# Patient Record
Sex: MALE | Race: WHITE
[De-identification: names, ages, dates, MRNs, and addresses within clinical notes are randomized per-mention and may not be internally consistent; named-entity substitution may affect disease eponyms.]

---

## 2018-02-01 ENCOUNTER — HOSPITAL ENCOUNTER (EMERGENCY)
Dept: HOSPITAL 17 - NEPC | Age: 78
Discharge: HOME | End: 2018-02-01
Payer: OTHER GOVERNMENT

## 2018-02-01 VITALS
SYSTOLIC BLOOD PRESSURE: 161 MMHG | HEART RATE: 85 BPM | RESPIRATION RATE: 18 BRPM | OXYGEN SATURATION: 97 % | DIASTOLIC BLOOD PRESSURE: 73 MMHG | TEMPERATURE: 98.3 F

## 2018-02-01 VITALS — BODY MASS INDEX: 22.05 KG/M2 | HEIGHT: 68 IN | WEIGHT: 145.51 LBS

## 2018-02-01 DIAGNOSIS — T81.4XXA: Primary | ICD-10-CM

## 2018-02-01 DIAGNOSIS — I10: ICD-10-CM

## 2018-02-01 DIAGNOSIS — B96.20: ICD-10-CM

## 2018-02-01 LAB
ALBUMIN SERPL-MCNC: 3.6 GM/DL (ref 3.4–5)
ALP SERPL-CCNC: 102 U/L (ref 45–117)
ALT SERPL-CCNC: 18 U/L (ref 12–78)
AST SERPL-CCNC: 15 U/L (ref 15–37)
BASOPHILS # BLD AUTO: 0 TH/MM3 (ref 0–0.2)
BASOPHILS NFR BLD: 0.4 % (ref 0–2)
BILIRUB SERPL-MCNC: 0.7 MG/DL (ref 0.2–1)
BUN SERPL-MCNC: 26 MG/DL (ref 7–18)
CALCIUM SERPL-MCNC: 9.4 MG/DL (ref 8.5–10.1)
CHLORIDE SERPL-SCNC: 103 MEQ/L (ref 98–107)
CREAT SERPL-MCNC: 1.27 MG/DL (ref 0.6–1.3)
EOSINOPHIL # BLD: 0.1 TH/MM3 (ref 0–0.4)
EOSINOPHIL NFR BLD: 1 % (ref 0–4)
ERYTHROCYTE [DISTWIDTH] IN BLOOD BY AUTOMATED COUNT: 12.8 % (ref 11.6–17.2)
GFR SERPLBLD BASED ON 1.73 SQ M-ARVRAT: 55 ML/MIN (ref 89–?)
GLUCOSE SERPL-MCNC: 105 MG/DL (ref 74–106)
HCO3 BLD-SCNC: 26.1 MEQ/L (ref 21–32)
HCT VFR BLD CALC: 41.4 % (ref 39–51)
HGB BLD-MCNC: 14.1 GM/DL (ref 13–17)
LYMPHOCYTES # BLD AUTO: 1.7 TH/MM3 (ref 1–4.8)
LYMPHOCYTES NFR BLD AUTO: 18.3 % (ref 9–44)
MCH RBC QN AUTO: 31.3 PG (ref 27–34)
MCHC RBC AUTO-ENTMCNC: 34 % (ref 32–36)
MCV RBC AUTO: 92 FL (ref 80–100)
MONOCYTE #: 0.9 TH/MM3 (ref 0–0.9)
MONOCYTES NFR BLD: 9.8 % (ref 0–8)
NEUTROPHILS # BLD AUTO: 6.7 TH/MM3 (ref 1.8–7.7)
NEUTROPHILS NFR BLD AUTO: 70.5 % (ref 16–70)
PLATELET # BLD: 419 TH/MM3 (ref 150–450)
PMV BLD AUTO: 8.5 FL (ref 7–11)
PROT SERPL-MCNC: 8.5 GM/DL (ref 6.4–8.2)
RBC # BLD AUTO: 4.5 MIL/MM3 (ref 4.5–5.9)
SODIUM SERPL-SCNC: 139 MEQ/L (ref 136–145)
WBC # BLD AUTO: 9.5 TH/MM3 (ref 4–11)

## 2018-02-01 PROCEDURE — 87040 BLOOD CULTURE FOR BACTERIA: CPT

## 2018-02-01 PROCEDURE — 87070 CULTURE OTHR SPECIMN AEROBIC: CPT

## 2018-02-01 PROCEDURE — 87186 SC STD MICRODIL/AGAR DIL: CPT

## 2018-02-01 PROCEDURE — 80053 COMPREHEN METABOLIC PANEL: CPT

## 2018-02-01 PROCEDURE — 99283 EMERGENCY DEPT VISIT LOW MDM: CPT

## 2018-02-01 PROCEDURE — 85025 COMPLETE CBC W/AUTO DIFF WBC: CPT

## 2018-02-01 PROCEDURE — 87205 SMEAR GRAM STAIN: CPT

## 2018-02-01 PROCEDURE — 83605 ASSAY OF LACTIC ACID: CPT

## 2018-02-01 PROCEDURE — 87077 CULTURE AEROBIC IDENTIFY: CPT

## 2018-02-01 NOTE — PD
HPI


Chief Complaint:  Medical Device Problem


Time Seen by Provider:  16:28


Travel History


International Travel<30 days:  No


Contact w/Intl Traveler<30days:  No


Traveled to known affect area:  No





History of Present Illness


HPI


77-year-old male complains of drainage from the surgical wound on the abdomen.  

Patient has history of diverticulitis with bowel perforation.  Patient status 

post bowel surgery with colostomy in [place] a month ago.  Patient states that 

he has serosanguineous drainage from the surgical wound for the past 3 weeks.  

Patient denies any fever chills.  Patient denies abdominal pain.  Patient 

states that the ostomy has been draining well.  Patient has appointment with 

surgeon tomorrow in [place].





PFSH


Past Medical History


Cardiovascular Problems:  Yes


Diverticulitis:  Yes


Hiatal Hernia:  Yes


Hypertension:  Yes





Past Surgical History


Other Surgery:  Yes (hernia sx, ostomy)





Social History


Alcohol Use:  No


Tobacco Use:  No


Substance Use:  No





Allergies-Medications


(Allergen,Severity, Reaction):  


Coded Allergies:  


     pollen extracts (Verified  Adverse Reaction, Mild, Sneezing, 2/1/18)


Reported Meds & Prescriptions





Reported Meds & Active Scripts


Active


Doxycycline Hyclate 100 Mg Cap 100 Mg PO BID


Cipro (Ciprofloxacin HCl) 500 Mg Tab 500 Mg PO BID


Reported


Allopurinol 100 Mg Tab 100 Mg PO DAILY


Hydrochlorothiazide 12.5 Mg Cap 12.5 Mg PO DAILY


Atenolol 25 Mg Tab 12.5 Mg PO DAILY








Review of Systems


General / Constitutional:  No: Fever


Eyes:  No: Visual changes


HENT:  No: Headaches


Cardiovascular:  No: Chest Pain or Discomfort


Respiratory:  No: Shortness of Breath


Gastrointestinal:  No: Abdominal Pain


Genitourinary:  No: Dysuria


Musculoskeletal:  No: Pain


Skin:  No Rash


Neurologic:  No: Weakness


Psychiatric:  No: Depression


Endocrine:  No: Polydipsia


Hematologic/Lymphatic:  No: Easy Bruising





Physical Exam


Narrative


GENERAL: Well-nourished, well-developed patient.


SKIN: Focused skin assessment warm/dry.


HEAD: Normocephalic.


EYES: No scleral icterus. No injection or drainage. 


NECK: Supple, trachea midline. No JVD or lymphadenopathy.


CARDIOVASCULAR: Regular rate and rhythm without murmurs, gallops, or rubs. 


RESPIRATORY: Breath sounds equal bilaterally. No accessory muscle use.


GASTROINTESTINAL: Abdomen soft, non-tender, nondistended. 


MUSCULOSKELETAL: No cyanosis, or edema. 


BACK: Nontender without obvious deformity. No CVA tenderness. 


Examination of the surgical wound on the left side of the abdomen shows staples 

in place.  The wound edges redness with pussy discharge.  Ostomy in place and 

draining well.





Data


Data


Last Documented VS





Vital Signs








  Date Time  Temp Pulse Resp B/P (MAP) Pulse Ox O2 Delivery O2 Flow Rate FiO2


 


2/1/18 14:49 98.3 85 18 161/73 (102) 97   








Orders





 Orders


Complete Blood Count With Diff (2/1/18 15:02)


Comprehensive Metabolic Panel (2/1/18 15:02)


Lactic Acid Sepsis Protocol (2/1/18 15:02)


Blood Culture (2/1/18 15:02)


Wound Culture And Gram Stain (2/1/18 17:00)


Ciprofloxacin (Cipro) (2/1/18 17:15)


Doxycycline (Vibramycin) (2/1/18 17:15)


Ed Discharge Order (2/1/18 17:25)





Labs





Laboratory Tests








Test


  2/1/18


15:10 2/1/18


15:16


 


White Blood Count 9.5 TH/MM3  


 


Red Blood Count 4.50 MIL/MM3  


 


Hemoglobin 14.1 GM/DL  


 


Hematocrit 41.4 %  


 


Mean Corpuscular Volume 92.0 FL  


 


Mean Corpuscular Hemoglobin 31.3 PG  


 


Mean Corpuscular Hemoglobin


Concent 34.0 % 


  


 


 


Red Cell Distribution Width 12.8 %  


 


Platelet Count 419 TH/MM3  


 


Mean Platelet Volume 8.5 FL  


 


Neutrophils (%) (Auto) 70.5 %  


 


Lymphocytes (%) (Auto) 18.3 %  


 


Monocytes (%) (Auto) 9.8 %  


 


Eosinophils (%) (Auto) 1.0 %  


 


Basophils (%) (Auto) 0.4 %  


 


Neutrophils # (Auto) 6.7 TH/MM3  


 


Lymphocytes # (Auto) 1.7 TH/MM3  


 


Monocytes # (Auto) 0.9 TH/MM3  


 


Eosinophils # (Auto) 0.1 TH/MM3  


 


Basophils # (Auto) 0.0 TH/MM3  


 


CBC Comment DIFF FINAL  


 


Differential Comment   


 


Blood Urea Nitrogen 26 MG/DL  


 


Creatinine 1.27 MG/DL  


 


Random Glucose 105 MG/DL  


 


Total Protein 8.5 GM/DL  


 


Albumin 3.6 GM/DL  


 


Calcium Level 9.4 MG/DL  


 


Alkaline Phosphatase 102 U/L  


 


Aspartate Amino Transf


(AST/SGOT) 15 U/L 


  


 


 


Alanine Aminotransferase


(ALT/SGPT) 18 U/L 


  


 


 


Total Bilirubin 0.7 MG/DL  


 


Sodium Level 139 MEQ/L  


 


Potassium Level 3.7 MEQ/L  


 


Chloride Level 103 MEQ/L  


 


Carbon Dioxide Level 26.1 MEQ/L  


 


Anion Gap 10 MEQ/L  


 


Estimat Glomerular Filtration


Rate 55 ML/MIN 


  


 


 


Lactic Acid Level  1.2 mmol/L 











MDM


Medical Decision Making


Medical Screen Exam Complete:  Yes


Emergency Medical Condition:  Yes


Interpretation(s)


CBC within normal limit.  CMP within normal limit.


Differential Diagnosis


Differential diagnosis including cellulitis, abscess.


Narrative Course


77-year-old male with surgical wound infection.  Status post bowel surgery with 

colostomy in place.  Wound culture obtained.  Cipro 500 mg by mouth given.  

Doxycycline 100 mg by mouth given.





Diagnosis





 Primary Impression:  


 Surgical wound infection


 Qualified Codes:  T81.4XXA - Infection following a procedure, initial encounter


Patient Instructions:  General Instructions





***Additional Instructions:  


Cipro and doxycycline as directed.  Wound care daily.  Follow-up with surgeon 

as scheduled in the a.m.  Return if worse.


***Med/Other Pt SpecificInfo:  Prescription(s) given


Scripts


Sulfamethoxazole-Trimethoprim (Bactrim DS) 800-160 Mg Tab


1 TAB PO BID for Infection, #20 TAB 0 Refills


   Prov: Panda Campos MD         2/1/18 


Ciprofloxacin (Cipro) 500 Mg Tab


500 MG PO BID for Infection, #20 TAB 0 Refills


   Prov: Panda Campos MD         2/1/18


Disposition:  01 DISCHARGE HOME


Condition:  Stable











Panda Campos MD Feb 1, 2018 17:12

## 2018-02-24 ENCOUNTER — HOSPITAL ENCOUNTER (INPATIENT)
Dept: HOSPITAL 17 - NEPC | Age: 78
Discharge: HOME | DRG: 863 | End: 2018-02-24
Attending: FAMILY MEDICINE | Admitting: FAMILY MEDICINE
Payer: OTHER GOVERNMENT

## 2018-02-24 VITALS
OXYGEN SATURATION: 99 % | DIASTOLIC BLOOD PRESSURE: 78 MMHG | TEMPERATURE: 98.3 F | RESPIRATION RATE: 18 BRPM | SYSTOLIC BLOOD PRESSURE: 163 MMHG | HEART RATE: 66 BPM

## 2018-02-24 VITALS
OXYGEN SATURATION: 98 % | SYSTOLIC BLOOD PRESSURE: 170 MMHG | DIASTOLIC BLOOD PRESSURE: 73 MMHG | TEMPERATURE: 97.5 F | HEART RATE: 74 BPM | RESPIRATION RATE: 16 BRPM

## 2018-02-24 VITALS — DIASTOLIC BLOOD PRESSURE: 65 MMHG | SYSTOLIC BLOOD PRESSURE: 144 MMHG

## 2018-02-24 VITALS — WEIGHT: 132.72 LBS | HEIGHT: 68 IN | BODY MASS INDEX: 20.11 KG/M2

## 2018-02-24 VITALS — OXYGEN SATURATION: 97 %

## 2018-02-24 VITALS — DIASTOLIC BLOOD PRESSURE: 66 MMHG | SYSTOLIC BLOOD PRESSURE: 144 MMHG | HEART RATE: 87 BPM

## 2018-02-24 DIAGNOSIS — E78.00: ICD-10-CM

## 2018-02-24 DIAGNOSIS — I10: ICD-10-CM

## 2018-02-24 DIAGNOSIS — T81.4XXA: Primary | ICD-10-CM

## 2018-02-24 DIAGNOSIS — H91.90: ICD-10-CM

## 2018-02-24 DIAGNOSIS — L02.211: ICD-10-CM

## 2018-02-24 DIAGNOSIS — Z93.3: ICD-10-CM

## 2018-02-24 LAB
ALBUMIN SERPL-MCNC: 3.8 GM/DL (ref 3.4–5)
ALP SERPL-CCNC: 75 U/L (ref 45–117)
ALT SERPL-CCNC: 31 U/L (ref 12–78)
AST SERPL-CCNC: 14 U/L (ref 15–37)
BASOPHILS # BLD AUTO: 0 TH/MM3 (ref 0–0.2)
BASOPHILS NFR BLD: 0.7 % (ref 0–2)
BILIRUB SERPL-MCNC: 0.7 MG/DL (ref 0.2–1)
BUN SERPL-MCNC: 16 MG/DL (ref 7–18)
CALCIUM SERPL-MCNC: 9.2 MG/DL (ref 8.5–10.1)
CHLORIDE SERPL-SCNC: 103 MEQ/L (ref 98–107)
CREAT SERPL-MCNC: 1.04 MG/DL (ref 0.6–1.3)
EOSINOPHIL # BLD: 0.1 TH/MM3 (ref 0–0.4)
EOSINOPHIL NFR BLD: 2.2 % (ref 0–4)
ERYTHROCYTE [DISTWIDTH] IN BLOOD BY AUTOMATED COUNT: 13.7 % (ref 11.6–17.2)
GFR SERPLBLD BASED ON 1.73 SQ M-ARVRAT: 69 ML/MIN (ref 89–?)
GLUCOSE SERPL-MCNC: 100 MG/DL (ref 74–106)
HCO3 BLD-SCNC: 30.3 MEQ/L (ref 21–32)
HCT VFR BLD CALC: 36.2 % (ref 39–51)
HGB BLD-MCNC: 12.7 GM/DL (ref 13–17)
LYMPHOCYTES # BLD AUTO: 1.1 TH/MM3 (ref 1–4.8)
LYMPHOCYTES NFR BLD AUTO: 16.9 % (ref 9–44)
MCH RBC QN AUTO: 31.5 PG (ref 27–34)
MCHC RBC AUTO-ENTMCNC: 34.9 % (ref 32–36)
MCV RBC AUTO: 90.1 FL (ref 80–100)
MONOCYTE #: 0.5 TH/MM3 (ref 0–0.9)
MONOCYTES NFR BLD: 8.5 % (ref 0–8)
NEUTROPHILS # BLD AUTO: 4.6 TH/MM3 (ref 1.8–7.7)
NEUTROPHILS NFR BLD AUTO: 71.7 % (ref 16–70)
PLATELET # BLD: 272 TH/MM3 (ref 150–450)
PMV BLD AUTO: 7.3 FL (ref 7–11)
PROT SERPL-MCNC: 7.6 GM/DL (ref 6.4–8.2)
RBC # BLD AUTO: 4.02 MIL/MM3 (ref 4.5–5.9)
SODIUM SERPL-SCNC: 139 MEQ/L (ref 136–145)
WBC # BLD AUTO: 6.4 TH/MM3 (ref 4–11)

## 2018-02-24 PROCEDURE — 87070 CULTURE OTHR SPECIMN AEROBIC: CPT

## 2018-02-24 PROCEDURE — 86403 PARTICLE AGGLUT ANTBDY SCRN: CPT

## 2018-02-24 PROCEDURE — 85025 COMPLETE CBC W/AUTO DIFF WBC: CPT

## 2018-02-24 PROCEDURE — 96365 THER/PROPH/DIAG IV INF INIT: CPT

## 2018-02-24 PROCEDURE — 87205 SMEAR GRAM STAIN: CPT

## 2018-02-24 PROCEDURE — 80053 COMPREHEN METABOLIC PANEL: CPT

## 2018-02-24 PROCEDURE — 96366 THER/PROPH/DIAG IV INF ADDON: CPT

## 2018-02-24 PROCEDURE — 74177 CT ABD & PELVIS W/CONTRAST: CPT

## 2018-02-24 RX ADMIN — Medication PRN ML: at 11:44

## 2018-02-24 RX ADMIN — Medication PRN ML: at 09:22

## 2018-02-24 NOTE — PD
HPI


Chief Complaint:  Wound/Suture/Staple Re-Check


Time Seen by Provider:  08:52


Travel History


International Travel<30 days:  No


Contact w/Intl Traveler<30days:  No


Traveled to known affect area:  No





History of Present Illness


HPI


Patient is a 77 year old male who comes in due to oozing from his surgical 

wound. He has an ostomy performed in early January and was here February 1 for 

a likely surgical site infection.  He says he took the prescribed antibiotics, 

but it is still producing a lot of pus.  He has a home health nurse who comes 

to check non him and she believes he has an infection. He says the ostomy is 

functioning well. He denies any pain to the site.  He denies fever or chills. 

Severity is moderate.





PFSH


Past Medical History


Hx Anticoagulant Therapy:  No


Cardiovascular Problems:  No


Chemotherapy:  No


Cerebrovascular Accident:  No


Diabetes:  No


Diverticulitis:  Yes


Hiatal Hernia:  Yes


Hypertension:  Yes


Respiratory:  No





Past Surgical History


Other Surgery:  Yes (hernia sx, ostomy)





Social History


Alcohol Use:  No


Tobacco Use:  No


Substance Use:  No





Allergies-Medications


(Allergen,Severity, Reaction):  


Coded Allergies:  


     pollen extracts (Verified  Adverse Reaction, Mild, Sneezing, 2/1/18)


Reported Meds & Prescriptions





Reported Meds & Active Scripts


Active


Lisinopril 20 Mg Tab 20 Mg PO BID


Bactrim DS (Sulfamethoxazole-Trimethoprim) 800-160 Mg Tab 1 Tab PO BID


Cipro (Ciprofloxacin HCl) 500 Mg Tab 500 Mg PO BID


Reported


Atorvastatin (Atorvastatin Calcium) 20 Mg Tab 20 Mg PO HS


Atenolol 50 Mg Tab 50 Mg PO DAILY


Allopurinol 100 Mg Tab 100 Mg PO DAILY


Hydrochlorothiazide 12.5 Mg Cap 12.5 Mg PO DAILY








Review of Systems


Except as stated in HPI:  all other systems reviewed are Neg


General / Constitutional:  No: Fever, Chills


HENT:  No: Headaches, Lightheadedness


Cardiovascular:  No: Chest Pain or Discomfort


Respiratory:  No: Shortness of Breath


Gastrointestinal:  No: Nausea, Vomiting, Abdominal Pain


Skin:  Positive Other (pus from wound)


Neurologic:  No: Weakness, Dizziness





Physical Exam


Narrative


GENERAL: Awake and alert, in no acute distress.


SKIN: Surgical wound to the abdomen leaking a large amount of greenish pus.  No 

surrounding erythema. 


HEAD: Atraumatic. Normocephalic. 


EYES: Pupils equal and round. No scleral icterus. 


ENT: Mucous membranes pink and moist.


NECK: Trachea midline. No JVD. 


CARDIOVASCULAR: Regular rate and rhythm.  No murmur appreciated.


RESPIRATORY: No accessory muscle use. Clear to auscultation. Breath sounds 

equal bilaterally. 


GASTROINTESTINAL: Abdomen soft, non-tender, nondistended. 


MUSCULOSKELETAL: No obvious deformities. No clubbing.  No cyanosis.  No edema. 


NEUROLOGICAL: Awake and alert. No obvious cranial nerve deficits.  Motor 

grossly within normal limits. Normal speech.


PSYCHIATRIC: Appropriate mood and affect; insight and judgment normal.





Data


Data


Last Documented VS





Vital Signs








  Date Time  Temp Pulse Resp B/P (MAP) Pulse Ox O2 Delivery O2 Flow Rate FiO2


 


2/24/18 09:12    144/65 (91)    


 


2/24/18 09:09     97 Room Air  


 


2/24/18 08:30 97.5 74 16     








Orders





 Orders


Complete Blood Count With Diff (2/24/18 08:58)


Comprehensive Metabolic Panel (2/24/18 08:58)


Ct Abd/Pel W Iv Contrast(Rout) (2/24/18 08:58)


Iv Access Insert/Monitor (2/24/18 08:58)


Ecg Monitoring (2/24/18 08:58)


Oximetry (2/24/18 08:58)


Sodium Chloride 0.9% Flush (Ns Flush) (2/24/18 09:00)


Piperacil-Tazo 3.375 Gm Premix (Zosyn 3. (2/24/18 09:00)


Wound Culture And Gram Stain (2/24/18 09:13)


Iohexol 350 Inj (Omnipaque 350 Inj) (2/24/18 10:40)


Admit Order (Ed Use Only) (2/24/18 )





Labs





Laboratory Tests








Test


  2/24/18


09:09 2/24/18


09:10


 


White Blood Count 6.4 TH/MM3  


 


Red Blood Count 4.02 MIL/MM3  


 


Hemoglobin 12.7 GM/DL  


 


Hematocrit 36.2 %  


 


Mean Corpuscular Volume 90.1 FL  


 


Mean Corpuscular Hemoglobin 31.5 PG  


 


Mean Corpuscular Hemoglobin


Concent 34.9 % 


  


 


 


Red Cell Distribution Width 13.7 %  


 


Platelet Count 272 TH/MM3  


 


Mean Platelet Volume 7.3 FL  


 


Neutrophils (%) (Auto) 71.7 %  


 


Lymphocytes (%) (Auto) 16.9 %  


 


Monocytes (%) (Auto) 8.5 %  


 


Eosinophils (%) (Auto) 2.2 %  


 


Basophils (%) (Auto) 0.7 %  


 


Neutrophils # (Auto) 4.6 TH/MM3  


 


Lymphocytes # (Auto) 1.1 TH/MM3  


 


Monocytes # (Auto) 0.5 TH/MM3  


 


Eosinophils # (Auto) 0.1 TH/MM3  


 


Basophils # (Auto) 0.0 TH/MM3  


 


CBC Comment DIFF FINAL  


 


Differential Comment   


 


Blood Urea Nitrogen  16 MG/DL 


 


Creatinine  1.04 MG/DL 


 


Random Glucose  100 MG/DL 


 


Total Protein  7.6 GM/DL 


 


Albumin  3.8 GM/DL 


 


Calcium Level  9.2 MG/DL 


 


Alkaline Phosphatase  75 U/L 


 


Aspartate Amino Transf


(AST/SGOT) 


  14 U/L 


 


 


Alanine Aminotransferase


(ALT/SGPT) 


  31 U/L 


 


 


Total Bilirubin  0.7 MG/DL 


 


Sodium Level  139 MEQ/L 


 


Potassium Level  3.6 MEQ/L 


 


Chloride Level  103 MEQ/L 


 


Carbon Dioxide Level  30.3 MEQ/L 


 


Anion Gap  6 MEQ/L 


 


Estimat Glomerular Filtration


Rate 


  69 ML/MIN 


 











[facility]


Medical Decision Making


Medical Screen Exam Complete:  Yes


Emergency Medical Condition:  Yes


Medical Record Reviewed:  Yes


Differential Diagnosis


surgical wound infection vs cellulitis vs abscess


Narrative Course


Patient is a 77 year old male who comes in because he is concerned his surgical 

wound is infected.  Exam shows a large amount of pus coming from his abdominal 

wound.  Repeat culture sent.  IV established, labs sent. Labs show no acute 

abnormalities.  CT abd/pelvis performed shows possible abscess.  





Given Zosyn based on previous sensitivities.  





Will be admitted for further management.





Diagnosis





 Primary Impression:  


 Wound, surgical, infected


 Qualified Codes:  T81.4XXD - Infection following a procedure, subsequent 

encounter


 Additional Impression:  


 Abdominal wall abscess





Admitting Information


Admitting Physician Requests:  Admit


Scripts


Lisinopril (Lisinopril) 20 Mg Tab


20 MG PO BID, #30 TAB 0 Refills


   Prov: Chance Beth MD         2/24/18











Margaret Burton MD Feb 24, 2018 11:28

## 2018-02-24 NOTE — PD.CONS
__________________________________________________





HPI


Service


General surgery


Consult Requested By


Family medicine residents


Reason for Consult


Wound infection


Primary Care Physician


[facility]


History of Present Illness


Is a very pleasant 77-year-old gentleman [facility] who [facility] 

[facility].  He 

apparently was diagnosed with perforated diverticulitis and presumably 

underwent sigmoid colon resection with colostomy.  He did follow up with his 

doctor surgeon Dr. MANAV PEÑA which is my phonetic spelling of his name, where 

he says the surgeon told him that his wound looked pretty good took a few 

staples out and made a follow-up appointment.  He says the pathology did not 

show cancer and that was the good news.  He subsequently developed a large 

amount of purulent drainage from his wound and came to the ER where he saw in 

the ER physician who did a culture put him on antibiotics and ordered home 

health care for dressing changes.  He has continued to have a large amount of 

purulent drainage from his midline wound and was instructed by his home health 

care nurses to return to the emergency department.  He was seen today where 

recommendations were made for admission, IV antibiotics, and dressing changes.  

A surgical consultation was requested for wound management.  The patient denies 

fevers and chills.  He indicates his colostomy has been functioning well.  He 

does indicate at one point that a narrow dressing was placed into a wound 

opening at the apex of his wound.





His wound care has really been in adequate and some of that is on the patient 

because he really is was not willing to go the [place] [facility] 

[place] for surgical follow-up and he did not have a surgeon here locally.





Review of Systems


Other


Large amount of purulent drainage from midline wound





Past Family Social History


Past Medical History


Hypertension high cholesterol and a uric acid associated bladder stone


Past Surgical History


Removal of the uric acid bladder stone many years ago.


Reported Medications


Lisinopril.  Anticholesterol medication.


Allergies:  


Coded Allergies:  


     pollen extracts (Verified  Adverse Reaction, Mild, Sneezing, 2/1/18)


Active Ordered Medications





Current Medications








 Medications


  (Trade)  Dose


 Ordered  Sig/Manda


 Route  Start Time


 Stop Time Status Last Admin


 


 Sodium Chloride  1,000 ml @ 


 105 mls/hr  Q9H32M


 IV  2/24/18 13:00


    2/24/18 13:42


 


 


  (NS Flush)  2 ml  UNSCH  PRN


 IV FLUSH  2/24/18 12:15


     


 


 


  (NS Flush)  2 ml  BID


 IV FLUSH  2/24/18 21:00


     


 


 


  (Zofran Inj)  4 mg  Q6H  PRN


 IVP  2/24/18 12:15


     


 


 


  (Narcan Inj)  0.4 mg  UNSCH  PRN


 IV PUSH  2/24/18 12:15


     


 


 


  (Milk Of


 Magnesia Liq)  30 ml  Q12H  PRN


 PO  2/24/18 12:15


     


 


 


  (Senokot)  17.2 mg  Q12H  PRN


 PO  2/24/18 12:15


     


 


 


  (Dulcolax Supp)  10 mg  DAILY  PRN


 RECTAL  2/24/18 12:15


     


 


 


  (Lactulose Liq)  30 ml  DAILY  PRN


 PO  2/24/18 12:15


     


 


 


  (Zyloprim)  100 mg  DAILY


 PO  2/25/18 09:00


     


 


 


  (Microzide)  12.5 mg  DAILY


 PO  2/25/18 09:00


     


 


 


  (Tenormin)  50 mg  DAILY


 PO  2/25/18 09:00


     


 


 


  (Prinivil)  20 mg  BID


 PO  2/24/18 21:00


     


 


 


  (Tylenol)  650 mg  Q4H  PRN


 PO  2/24/18 12:30


     


 


 


  (Lipitor)  20 mg  HS


 PO  2/24/18 21:00


     


 


 


 Piperacillin Sod/


 Tazobactam Sod  50 ml @ 


 100 mls/hr  Q6H


 IV  2/24/18 15:00


    2/24/18 14:35


 








Social History


He is a non-smoker.  He rarely uses alcohol and has used none since his surgery 

in January.





Physical Exam


Vital Signs





Vital Signs








  Date Time  Temp Pulse Resp B/P (MAP) Pulse Ox O2 Delivery O2 Flow Rate FiO2


 


2/24/18 16:02 98.3 66 18 163/78 (106) 99   


 


2/24/18 12:00  87  144/66 (92)    


 


2/24/18 09:12    144/65 (91)    


 


2/24/18 09:09     97 Room Air  


 


2/24/18 08:30 97.5 74 16 170/73 (105) 98 Room Air  








Physical Exam


He is a well-developed well-nourished older  gentleman who was 

standing at his bedside at the time of my arrival.  He is hard of hearing and 

his battery in his hearing aid has worn out.  He is pleasant and cooperative 

with the exam.  He wears corrective lenses for his vision.  HEENT shows a 

normocephalic atraumatic head his pupils are 3-4 round and equally reactive to 

light and his sclerae are anicteric.  His oropharynx is clear.  He has multiple 

caps and bridges which are not removable.  His oral mucosal membranes appear 

normal.  There is no erythema in the posterior oropharynx.  His neck is supple 

without adenopathy he has a midline trachea no jugular venous distention no 

lymphadenopathy and no carotid bruits.  His lung sounds are clear and equal 

anteriorly bilaterally his heart sounds are regular without obvious murmur rub 

or gallop.  His abdomen is soft and nondistended.  He has a colostomy in the 

left mid abdomen just lateral to the midline with some soft brown stool.  His 

midline incision had several staples still intact and there was obvious 

purulent drainage from the wound.  Exploration with a cotton tip applicator 

demonstrated 2 separate cavities of the wound were purulent drainage was 

expressible through.  These were cleansed with saline and normal saline wet-to-

dry dressings were placed in the upper 1 about a quarter of a 4 x 4 in the 

lower 1 about a half of a 4 x 4.  There was hypertrophic granulation tissue in 

the wound openings.  2 smaller wound openings with hypertrophic granulation 

tissue were present without significant pocket of pus beneath it.  There is no 

surrounding erythema.  He had normal bowel sounds.  Genital and rectal exams 

were deferred.  His extremities show no cyanosis clubbing or edema.  He has 

equal bilateral radial pulses.  He is awake and alert and oriented.


Laboratory





Laboratory Tests








Test


  2/24/18


09:09 2/24/18


09:10


 


White Blood Count 6.4  


 


Red Blood Count 4.02  


 


Hemoglobin 12.7  


 


Hematocrit 36.2  


 


Mean Corpuscular Volume 90.1  


 


Mean Corpuscular Hemoglobin 31.5  


 


Mean Corpuscular Hemoglobin


Concent 34.9 


  


 


 


Red Cell Distribution Width 13.7  


 


Platelet Count 272  


 


Mean Platelet Volume 7.3  


 


Neutrophils (%) (Auto) 71.7  


 


Lymphocytes (%) (Auto) 16.9  


 


Monocytes (%) (Auto) 8.5  


 


Eosinophils (%) (Auto) 2.2  


 


Basophils (%) (Auto) 0.7  


 


Neutrophils # (Auto) 4.6  


 


Lymphocytes # (Auto) 1.1  


 


Monocytes # (Auto) 0.5  


 


Eosinophils # (Auto) 0.1  


 


Basophils # (Auto) 0.0  


 


CBC Comment DIFF FINAL  


 


Differential Comment   


 


Blood Urea Nitrogen  16 


 


Creatinine  1.04 


 


Random Glucose  100 


 


Total Protein  7.6 


 


Albumin  3.8 


 


Calcium Level  9.2 


 


Alkaline Phosphatase  75 


 


Aspartate Amino Transf


(AST/SGOT) 


  14 


 


 


Alanine Aminotransferase


(ALT/SGPT) 


  31 


 


 


Total Bilirubin  0.7 


 


Sodium Level  139 


 


Potassium Level  3.6 


 


Chloride Level  103 


 


Carbon Dioxide Level  30.3 


 


Anion Gap  6 


 


Estimat Glomerular Filtration


Rate 


  69 


 














 Date/Time


Source Procedure


Growth Status


 


 


 2/24/18 09:10


Wound Abdomen Gram Stain


Pending Received


 


 2/24/18 09:10


Wound Abdomen Wound Culture


Pending Received








Result Diagram:  


2/24/18 0909 2/24/18 0910





Imaging


CT abdomen and pelvis shows wound infection with wound abscess not completely 

drained.  He has a left-sided colostomy.





Assessment and Plan


Assessment and Plan


77-year-old gentleman who is about 6 weeks out from exploratory surgery 

presumably with sigmoid resection and colostomy at the [facility] by 

Dr. Lerner.  He is received in adequate postoperative care partly due to 

the distance between his home here in [place] [facility] [place] in the [facility].  He has been draining purulent drainage from a midline wound 

infection that has not been adequately cared for.  I have recommended normal 

saline wet-to-dry dressing changes twice daily and continuation of oral 

antibiotics.  I do not believe the patient requires inpatient hospitalization 

for the care of a wound infection that routinely is taking care of as an 

outpatient.  I have discussed my impression with the patient as well as the 

family medicine resident who admitted the patient.  I have ordered a regular 

diet.  I have ordered twice daily normal saline wet-to-dry dressing changes.  I 

discussed with the family medicine resident Dr. JIGNESH LOUISE, and asked her to 

please make arrangements for discharge and home health care dressing changes as 

I have recommended.  I am happy to see the patient as an outpatient in my 

office to follow his wound healing.  It is likely the areas of hypertrophic 

granulation tissue would benefit from treatment with silver nitrate which I can 

do in my office.











Jd Juarez MD Feb 24, 2018 17:07

## 2018-02-24 NOTE — HHI.HP
[facility]


Service


Family Medicine


Primary Care Physician


[place] [facility]


Admission Diagnosis





surgical wound infections, abdominal wall abscess


Diagnoses:  


International Travel<30 Days:  No


Contact w/Intl Traveler<30days:  No


Known Affected Area:  No


History of Present Illness


Patient is a 77-year-old male past medical history of hypertension, high 

cholesterol, diverticulitis s/p ostomy placement [facility].10, [facility] 

[facility] [place]. Patient stated he had the ostomy placed in January due to 

diverticulitis and bowel perforation with possibility of reversal. Reports that 

a first week after surgery the incision began leaking creamy fluid. He has a 

home nurse service that comes twice a week to check on his incision. In early 

February he was seen by Dr. brooks after the nursing service advised to come to 

the emergency room due to possible wound infection. At that time he was given 2 

antibiotics (cipro and sulfa antibiotic) and completed a course for 10 days. 

However patient stated that the pus like drainage from the incision site worsen 

since completing course of antibiotics. Today after being seen by the nursing 

home service he was again advised to return to the emergency room for further 

evaluation of nonhealing surgical wound infection. Denies pain, fever, N/V, CP, 

SOB, chills, foul smell from drainage. Patient reports he has good ostomy 

output. Reports intermittent night sweats since surgery in 2018.


Of note: He last saw the surgeon that completed the operation 2 weeks post op. 

Patient stated he has follow-up appointment with surgeon on . 





In the ED patient was found to have a possible abscess vs seroma on abdomen/

pelvis CT.  He was also given 1 dose of Zosyn, abx option based on prior wound 

culture (taken 2018) results showing Escherichia coli sensitive to Zosyn .





Review of Systems


Constitutional:  COMPLAINS OF: Night Sweats, DENIES: Diaphoretic episodes, Fever

, Chills, Dizziness, Change in appetite


Eyes:  DENIES: Blurred vision, Vision loss


Ears, nose, mouth, throat:  DENIES: Throat pain, Running Nose


Respiratory:  DENIES: Cough, Wheezing, Shortness of breath


Cardiovascular:  DENIES: Chest pain, Palpitations, Syncope, Lower Extremity 

Edema


Gastrointestinal:  DENIES: Abdominal pain, Nausea, Vomiting


Musculoskeletal:  DENIES: Joint pain, Muscle aches


Integumentary:  DENIES: Rash


Hematologic/lymphatic:  DENIES: Bruising


Neurologic:  COMPLAINS OF: Paresthesias (chronic), DENIES: Headache





Past Family Social History


Past Medical History


HTN


high cholesterol


produce too much uric- allopironol


diverticulitis


Past Surgical History


ostomy, Rodney


removal of kidney stone from bladder, >15 yrs ago


Reported Medications





Current Medications








 Medications


  (Trade)  Dose


 Ordered  Sig/Manda


 Route  Start Time


 Stop Time Status Last Admin


 


  (NS Flush)  2 ml  UNSCH  PRN


 IV FLUSH  18 09:00


    18 09:22


 








Allergies:  


Coded Allergies:  


     pollen extracts (Verified  Adverse Reaction, Mild, Sneezing, 18)


Family History


mother, brother and sister- HTN


Social History


-l[place] with his cousin


-denies smoking and illicit drug use


-alcohol occasionally





Physical Exam


Vital Signs





Vital Signs








  Date Time  Temp Pulse Resp B/P (MAP) Pulse Ox O2 Delivery O2 Flow Rate FiO2


 


18 09:12    144/65 (91)    


 


18 09:09     97 Room Air  


 


18 08:30 97.5 74 16 170/73 (105) 98 Room Air  








Physical Exam


GENERAL: This is a well-nourished, well-developed patient, in no apparent 

distress.


SKIN: No rashes, ecchymoses or lesions. Cool and dry.


HEAD: Atraumatic. Normocephalic. No temporal or scalp tenderness.


EYES: Pupils equal round and reactive. Extraocular motions intact. No scleral 

icterus. No injection or drainage. 


ENT: Nose without bleeding, purulent drainage or septal hematoma. Throat 

without erythema, tonsillar hypertrophy or exudate. Uvula midline. Airway 

patent.


NECK: Trachea midline. No JVD or lymphadenopathy. Supple, nontender, no 

meningeal signs.


CARDIOVASCULAR: Normal s1 and S2. Regular rate and rhythm without murmurs, 

gallops, or rubs. 


RESPIRATORY: Clear to auscultation. Breath sounds equal bilaterally. No wheezes

, rales, or rhonchi.  


GASTROINTESTINAL: Abdomen soft, non-tender, nondistended. No hepato-splenomegaly

, or palpable masses. No guarding. Positive BS. Ostomy in place left mid 

quadrant. Incision with several stables in place, closed, draining purulent 

pus. No erythema and no pain upon palpation


MUSCULOSKELETAL: Extremities without clubbing, cyanosis, or edema. No joint 

tenderness, effusion, or edema noted. No calf tenderness. Negative Homans sign 

bilaterally. +2 DP


NEUROLOGICAL: Awake and alert. Cranial nerves II through XII intact.  Motor and 

sensory grossly within normal limits. Five out of 5 muscle strength in all 

muscle groups.  Normal speech.


Laboratory





Laboratory Tests








Test


  18


09:09 18


09:10


 


White Blood Count 6.4  


 


Red Blood Count 4.02  


 


Hemoglobin 12.7  


 


Hematocrit 36.2  


 


Mean Corpuscular Volume 90.1  


 


Mean Corpuscular Hemoglobin 31.5  


 


Mean Corpuscular Hemoglobin


Concent 34.9 


  


 


 


Red Cell Distribution Width 13.7  


 


Platelet Count 272  


 


Mean Platelet Volume 7.3  


 


Neutrophils (%) (Auto) 71.7  


 


Lymphocytes (%) (Auto) 16.9  


 


Monocytes (%) (Auto) 8.5  


 


Eosinophils (%) (Auto) 2.2  


 


Basophils (%) (Auto) 0.7  


 


Neutrophils # (Auto) 4.6  


 


Lymphocytes # (Auto) 1.1  


 


Monocytes # (Auto) 0.5  


 


Eosinophils # (Auto) 0.1  


 


Basophils # (Auto) 0.0  


 


CBC Comment DIFF FINAL  


 


Differential Comment   


 


Blood Urea Nitrogen  16 


 


Creatinine  1.04 


 


Random Glucose  100 


 


Total Protein  7.6 


 


Albumin  3.8 


 


Calcium Level  9.2 


 


Alkaline Phosphatase  75 


 


Aspartate Amino Transf


(AST/SGOT) 


  14 


 


 


Alanine Aminotransferase


(ALT/SGPT) 


  31 


 


 


Total Bilirubin  0.7 


 


Sodium Level  139 


 


Potassium Level  3.6 


 


Chloride Level  103 


 


Carbon Dioxide Level  30.3 


 


Anion Gap  6 


 


Estimat Glomerular Filtration


Rate 


  69 


 














 Date/Time


Source Procedure


Growth Status


 


 


 18 09:10


Wound Abdomen Gram Stain


Pending Received


 


 18 09:10


Wound Abdomen Wound Culture


Pending Received








Result Diagram:  


18 0909                                                                   

             18 0910





Imaging





Last Impressions








Abdomen/Pelvis CT 18 0858 Signed





Impressions: 





 Service Date/Time:  2018 10:31 - CONCLUSION:  1. Status 





 post midline incision with fluid seen in the subcutaneous fat. This could be a 





 postoperative seroma. Abscess cannot be excluded. 2. Status post resection of 





 portion of the sigmoid colon with a left colostomy. 3. Nonspecific 1.2 cm and 





 1.0 cm hepatic lesions. These could represent small cysts or hemangiomas. 

Other 





 etiologies cannot be excluded. 4. Cortical thinning of the upper left kidney. 

5. 





 Atherosclerotic calcifications. 6. Prostatic enlargement.     Ramsey Stewart MD 











Caprini VTE Risk Assessment


Caprini VTE Risk Assessment:  Mod/High Risk (score >= 2)


VTE Pharm Contraindication:  


Caprini Risk Assessment Model











 Point Value = 1          Point Value = 2  Point Value = 3  Point Value = 5


 


Age 41-60


Minor surgery


BMI > 25 kg/m2


Swollen legs


Varicose veins


Pregnancy or postpartum


History of unexplained or recurrent


   spontaneous 


Oral contraceptives or hormone


   replacement


Sepsis (< 1 month)


Serious lung disease, including


   pneumonia (< 1 month)


Abnormal pulmonary function


Acute myocardial infarction


Congestive heart failure (< 1 month)


History of inflammatory bowel disease


Medical patient at bed rest Age 61-74


Arthroscopic surgery


Major open surgery (> 45 min)


Laparoscopic surgery (> 45 min)


Malignancy


Confined to bed (> 72 hours)


Immobilizing plaster cast


Central venous access Age >= 75


History of VTE


Family history of VTE


Factor V Leiden


Prothrombin 15052S


Lupus anticoagulant


Anticardiolipin antibodies


Elevated serum homocysteine


Heparin-induced thrombocytopenia


Other congenital or acquired


   thrombophilia Stroke (< 1 month)


Elective arthroplasty


Hip, pelvis, or leg fracture


Acute spinal cord injury (< 1 month)








Prophylaxis Regimen











   Total Risk


Factor Score Risk Level Prophylaxis Regimen


 


0-1      Low Early ambulation


 


2 Moderate Order ONE of the following:


*Sequential Compression Device (SCD)


*Heparin 5000 units SQ BID


 


3-4 Higher Order ONE of the following medications:


*Heparin 5000 units SQ TID


*Enoxaparin/Lovenox 40 mg SQ daily (WT < 150 kg, CrCl > 30 mL/min)


*Enoxaparin/Lovenox 30 mg SQ daily (WT < 150 kg, CrCl > 10-29 mL/min)


*Enoxaparin/Lovenox 30 mg SQ BID (WT < 150 kg, CrCl > 30 mL/min)


AND/OR


*Sequential Compression Device (SCD)


 


5 or more Highest Order ONE of the following medications:


*Heparin 5000 units SQ TID (Preferred with Epidurals)


*Enoxaparin/Lovenox 40 mg SQ daily (WT < 150 kg, CrCl > 30 mL/min)


*Enoxaparin/Lovenox 30 mg SQ daily (WT < 150 kg, CrCl > 10-29 mL/min)


*Enoxaparin/Lovenox 30 mg SQ BID (WT < 150 kg, CrCl > 30 mL/min)


AND


*Sequential Compression Device (SCD)











Assessment and Plan


Assessment and Plan


Patient is a 77-year-old male past medical history of hypertension, high 

cholesterol, diverticulitis s/p ostomy placement [facility]ney.10, [facility] [place]. Patient admitted for treatment of non-healing surgical wound. Pt 

otherwise asymptomatic, stable, non-toxic appearing, VS WNL, no leukocytosis.


Code Status


Full code


Discussed Condition With


[facility] Dr. Beth


[facility] Dr. Dillon


Problem List:  


(1) Wound, surgical, infected


ICD Codes:  T81.4XXA - Infection following a procedure, initial encounter


Status:  Acute


Plan:  Patient s/p resection of portion of sigmoid with left ileostomy placed 

on January 10, 2018 after diagnosis of diverticulitis and perforated bowel. 

Surgery was done at [facility] in [place]. He last saw the surgeon that 

completed the operation 2 weeks post op. Patient stated he has follow-up 

appointment with surgeon on . 


CT abd/pelvis: Fluid seen in the subcutaneous fat. Postop seroma versus 

abscess. 


Blood pressure slightly elevated, other vital signs and blood work WNL


-c/w zosyn while in hospital





-IR consult cancelled, spoke with Dr. Torres, based on findings on CT abdomen 

and pelvis patient is not a candidate for IR procedure as abscess appears to be 

small and superficial. Patient may benefit from wound packing. 


-General surgery consult placed, appreciate recommendations


-f/u am labs, wound and blood cx





(2) Hypertension


ICD Codes:  I10 - Essential (primary) hypertension


Status:  Chronic


Plan:  -Elevated -170/60-70s


-will continue to monitor and adjust medication if indicated


-c/w home medication (lisinopril 20mg BID, atenolol 50 QD and HCTZ 12.5mg QD) 


-Pt stated he took morning dose of BP medications





(3) High cholesterol


ICD Codes:  E78.00 - Pure hypercholesterolemia, unspecified


Status:  Chronic


Plan:  -c/w with atorvastatin medication





(4) History of kidney stones


ICD Codes:  Z87.442 - Personal history of urinary calculi


Status:  Chronic


Plan:  c/w with home allopurinol medication





(5) Nutrition, metabolism, and development symptoms


ICD Codes:  R63.8 - Other symptoms and signs concerning food and fluid intake


Plan:  Fluids: 105mls/hr


Electrolytes: replete as needed


Nutrition: NPO pending anticipatory surgery intervention





DVT ppx: SCDs








Problem Qualifiers





(1) Wound, surgical, infected:  


Qualified Codes:  T81.4XXD - Infection following a procedure, subsequent 

encounter








Sierra Huitron MD, R1 2018 11:34

## 2018-02-24 NOTE — RADRPT
EXAM DATE/TIME:  02/24/2018 10:31 

 

[facility] COMPARISON:     

No previous studies available for comparison.

 

 

INDICATIONS :     

Pain around mid abdomen ostomy site. 

                      

 

IV CONTRAST:     

90 cc Omnipaque 350 (iohexol) IV 

 

 

ORAL CONTRAST:      

No oral contrast ingested.

                      

 

RADIATION DOSE:     

5.25 CTDIvol (mGy) 

 

 

MEDICAL HISTORY :     

Hypertension. Hernia, hiatal. 

 

SURGICAL HISTORY :       

Ostomy

 

ENCOUNTER:      

Initial

 

ACUITY:      

1 week

 

PAIN SCALE:      

4/10

 

LOCATION:        

buttock 

 

TECHNIQUE:     

Volumetric scanning of the abdomen and pelvis was performed.  Using automated exposure control and ad
justment of the mA and/or kV according to patient size, radiation dose was kept as low as reasonably 
achievable to obtain optimal diagnostic quality images.  DICOM format image data is available electro
nically for review and comparison.  

 

FINDINGS:     

 

LOWER CHEST:     

There is mild increased density the posterior lung bases bilaterally likely related to atelectasis. C
oronary artery calcifications are seen.

 

LIVER:     

There is a 1.2 cm hypodensity seen at the inferior aspect of the right lobe of the liver. There is al
so a 1 cm hypodensity seen in the inferior aspect of the right lobe liver as seen on the coronal imag
es. These are nonspecific. The gallbladder is contracted.

 

SPLEEN:     

Normal size without lesion.

 

PANCREAS:     

Within normal limits.

 

KIDNEYS:     

There is cortical thinning at the superior aspect of the left kidney. There is a 1.7 cm cyst seen at 
the mid left kidney.

 

ADRENAL GLANDS:     

Within normal limits.

 

VASCULAR:     

There are atherosclerotic calcifications seen throughout the arcuate system.

 

BOWEL/MESENTERY:     

There is a colostomy in the left lower quadrant with the proximal sigmoid colon extending into the co
lostomy site. It appears the distal sigmoid colon and rectum are present and are filled with dense co
ntrast. 

 

ABDOMINAL WALL:     

There is postoperative change with some fluid density seen in the subcutaneous midline just deep to m
idline skin staples. The fluid density extends over a 6 cm length and measures approximately 1.4 cm i
n transverse dimension 1.8 cm in AP dimension. 

 

RETROPERITONEUM:     

There is no lymphadenopathy. 

 

BLADDER:     

No wall thickening or mass. 

 

REPRODUCTIVE:     

The prostate is enlarged.

 

INGUINAL:     

There is no lymphadenopathy or hernia. 

 

MUSCULOSKELETAL:     

There is degenerative change in the lower lumbar spine.

 

CONCLUSION:     

1. Status post midline incision with fluid seen in the subcutaneous fat. This could be a postoperativ
e seroma. Abscess cannot be excluded.

2. Status post resection of portion of the sigmoid colon with a left colostomy.

3. Nonspecific 1.2 cm and 1.0 cm hepatic lesions. These could represent small cysts or hemangiomas. O
ther etiologies cannot be excluded.

4. Cortical thinning of the upper left kidney.

5. Atherosclerotic calcifications.

6. Prostatic enlargement.

 

 

 

 Ramsey Stewart MD on February 24, 2018 at 11:02           

Board Certified Radiologist.

 This report was verified electronically.

## 2018-02-24 NOTE — HHI.DCPOC
Discharge Care Plan


Diagnosis:  


(1) Wound, surgical, infected


(2) Hypertension


(3) High cholesterol


Goals to Promote Your Health


* To prevent worsening of your condition and complications


* To maintain your health at the optimal level


Directions to Meet Your Goals


*** Take your medications as prescribed


*** Follow your dietary instruction


*** Follow activity as directed








*** Keep your appointments as scheduled


*** Take your immunizations and boosters as scheduled


*** If your symptoms worsen call your PCP, if no PCP go to Urgent Care Center 

or Emergency Room***


*** Smoking is Dangerous to Your Health. Avoid second hand smoke***


***Call the 24-hour hour crisis hotline for domestic abuse at 1-539.896.1320***











Chance Beth MD Feb 24, 2018 18:30